# Patient Record
Sex: FEMALE | Race: WHITE | Employment: UNEMPLOYED | URBAN - METROPOLITAN AREA
[De-identification: names, ages, dates, MRNs, and addresses within clinical notes are randomized per-mention and may not be internally consistent; named-entity substitution may affect disease eponyms.]

---

## 2017-01-07 ENCOUNTER — TRANSFERRED RECORDS (OUTPATIENT)
Dept: HEALTH INFORMATION MANAGEMENT | Facility: CLINIC | Age: 67
End: 2017-01-07

## 2017-01-10 ENCOUNTER — TELEPHONE (OUTPATIENT)
Dept: FAMILY MEDICINE | Facility: OTHER | Age: 67
End: 2017-01-10

## 2017-01-10 NOTE — TELEPHONE ENCOUNTER
Patient called to schedule appointment for a hospital follow-up    Patient was admitted to:  [facility]  Discharged date: 1/10/17  Reason for hospital admission:  Pneumonia, copd  Does patient have future appointment scheduled with provider? Yes Misty  Date of future appointment:  1/13/2017      This information will be used to help the care team plan for the patients upcoming visit.  The triage RN may determine that a follow up call is necessary and reach out to the patient via the phone number listed in the chart.     Please route this message high priority to the Triage RN pool.     Apologies for using the [employer] message, I am not familiar with how [employer] is doing these currently. Thank you for understanding.

## 2017-01-11 NOTE — TELEPHONE ENCOUNTER
This pt is not currently in Care Coordination, please outreach to pt and if the provider feels they would benefit from our assistance please place referral     Thank you,     Alida Walker RN,BSN  Clinical Care Coordinator    [facility] [phone]  [facility] [phone]

## 2017-02-07 ENCOUNTER — TELEPHONE (OUTPATIENT)
Dept: FAMILY MEDICINE | Facility: OTHER | Age: 67
End: 2017-02-07

## 2017-02-07 NOTE — TELEPHONE ENCOUNTER
Tia from [facility] calling to get Mammo gram for patient to compare. AMBER has been completed by patient at their clinic. Please return call to [phone]     Carol Ibarra  Reception/ Scheduling